# Patient Record
Sex: FEMALE | Race: WHITE
[De-identification: names, ages, dates, MRNs, and addresses within clinical notes are randomized per-mention and may not be internally consistent; named-entity substitution may affect disease eponyms.]

---

## 2023-04-23 ENCOUNTER — HOSPITAL ENCOUNTER (EMERGENCY)
Dept: HOSPITAL 12 - ER | Age: 58
Discharge: HOME | End: 2023-04-23
Payer: COMMERCIAL

## 2023-04-23 VITALS — HEIGHT: 67 IN | WEIGHT: 180 LBS | BODY MASS INDEX: 28.25 KG/M2
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
## 2023-04-23 DIAGNOSIS — S82.832A: Primary | ICD-10-CM
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
## 2023-04-23 PROCEDURE — A4663 DIALYSIS BLOOD PRESSURE CUFF: HCPCS

## 2023-04-23 RX ADMIN — HYDROCODONE BITARTRATE AND ACETAMINOPHEN ONE TAB: 10; 325 TABLET ORAL at 10:00

## 2023-04-23 NOTE — NUR
removed shoe, cut off shin guard and sock.  No noticable swelling or 
discoloration.  PMS intact, cap refill approx 3 sec.  Ice packs applied.  Foot 
and lower leg elevated with pillow.

## 2023-04-23 NOTE — NUR
Applied short posterior splint for left foot/leg.  PMS intact, cap refill <2 
sec.  Measured for crutches, gave pt instructions.  



Gave pt RX and d/c instructions, pt verbalized understanding.